# Patient Record
Sex: MALE | Race: WHITE | Employment: OTHER | URBAN - METROPOLITAN AREA
[De-identification: names, ages, dates, MRNs, and addresses within clinical notes are randomized per-mention and may not be internally consistent; named-entity substitution may affect disease eponyms.]

---

## 2023-06-26 ENCOUNTER — OFFICE VISIT (OUTPATIENT)
Dept: PRIMARY CARE | Facility: CLINIC | Age: 68
End: 2023-06-26
Payer: MEDICARE

## 2023-06-26 VITALS
BODY MASS INDEX: 31.92 KG/M2 | TEMPERATURE: 97.5 F | OXYGEN SATURATION: 97 % | WEIGHT: 198.6 LBS | HEIGHT: 66 IN | DIASTOLIC BLOOD PRESSURE: 70 MMHG | SYSTOLIC BLOOD PRESSURE: 134 MMHG | HEART RATE: 88 BPM

## 2023-06-26 DIAGNOSIS — K63.5 POLYP OF COLON, UNSPECIFIED PART OF COLON, UNSPECIFIED TYPE: ICD-10-CM

## 2023-06-26 DIAGNOSIS — E78.5 HYPERLIPIDEMIA, UNSPECIFIED HYPERLIPIDEMIA TYPE: ICD-10-CM

## 2023-06-26 DIAGNOSIS — Z00.00 ANNUAL PHYSICAL EXAM: Primary | ICD-10-CM

## 2023-06-26 DIAGNOSIS — Z12.5 PROSTATE CANCER SCREENING: ICD-10-CM

## 2023-06-26 DIAGNOSIS — D69.6 THROMBOCYTOPENIA (CMS-HCC): ICD-10-CM

## 2023-06-26 DIAGNOSIS — E11.9 DM TYPE 2, NOT AT GOAL (MULTI): ICD-10-CM

## 2023-06-26 PROBLEM — R79.89 ABNORMAL LFTS: Status: ACTIVE | Noted: 2023-06-26

## 2023-06-26 PROBLEM — K62.5 RECTAL BLEEDING: Status: ACTIVE | Noted: 2023-06-26

## 2023-06-26 PROBLEM — R16.0 LIVER MASS: Status: ACTIVE | Noted: 2023-06-26

## 2023-06-26 PROBLEM — K92.1 BLOOD IN STOOL: Status: ACTIVE | Noted: 2023-06-26

## 2023-06-26 PROBLEM — Z86.0100 PERSONAL HISTORY OF COLONIC POLYPS: Status: ACTIVE | Noted: 2023-06-26

## 2023-06-26 PROBLEM — Z86.010 PERSONAL HISTORY OF COLONIC POLYPS: Status: ACTIVE | Noted: 2023-06-26

## 2023-06-26 LAB
ALANINE AMINOTRANSFERASE (SGPT) (U/L) IN SER/PLAS: 39 U/L (ref 10–52)
ALBUMIN (G/DL) IN SER/PLAS: 4.7 G/DL (ref 3.4–5)
ALKALINE PHOSPHATASE (U/L) IN SER/PLAS: 45 U/L (ref 33–136)
ANION GAP IN SER/PLAS: 14 MMOL/L (ref 10–20)
APPEARANCE, URINE: CLEAR
ASPARTATE AMINOTRANSFERASE (SGOT) (U/L) IN SER/PLAS: 25 U/L (ref 9–39)
BILIRUBIN TOTAL (MG/DL) IN SER/PLAS: 1.9 MG/DL (ref 0–1.2)
BILIRUBIN, URINE: NEGATIVE
BLOOD, URINE: NEGATIVE
CALCIUM (MG/DL) IN SER/PLAS: 9.9 MG/DL (ref 8.6–10.6)
CARBON DIOXIDE, TOTAL (MMOL/L) IN SER/PLAS: 27 MMOL/L (ref 21–32)
CHLORIDE (MMOL/L) IN SER/PLAS: 102 MMOL/L (ref 98–107)
CHOLESTEROL (MG/DL) IN SER/PLAS: 168 MG/DL (ref 0–199)
CHOLESTEROL IN HDL (MG/DL) IN SER/PLAS: 38.4 MG/DL
CHOLESTEROL/HDL RATIO: 4.4
COLOR, URINE: ABNORMAL
CREAT UR STRIP-MCNC: 200 MG/DL
CREATININE (MG/DL) IN SER/PLAS: 0.8 MG/DL (ref 0.5–1.3)
ERYTHROCYTE DISTRIBUTION WIDTH (RATIO) BY AUTOMATED COUNT: 13.5 % (ref 11.5–14.5)
ERYTHROCYTE MEAN CORPUSCULAR HEMOGLOBIN CONCENTRATION (G/DL) BY AUTOMATED: 34.3 G/DL (ref 32–36)
ERYTHROCYTE MEAN CORPUSCULAR VOLUME (FL) BY AUTOMATED COUNT: 93 FL (ref 80–100)
ERYTHROCYTES (10*6/UL) IN BLOOD BY AUTOMATED COUNT: 4.87 X10E12/L (ref 4.5–5.9)
GFR MALE: >90 ML/MIN/1.73M2
GLUCOSE (MG/DL) IN SER/PLAS: 202 MG/DL (ref 74–99)
GLUCOSE, URINE: ABNORMAL MG/DL
HBA1C MFR BLD: 7 % (ref 4.2–6.5)
HEMATOCRIT (%) IN BLOOD BY AUTOMATED COUNT: 45.5 % (ref 41–52)
HEMOGLOBIN (G/DL) IN BLOOD: 15.6 G/DL (ref 13.5–17.5)
KETONES, URINE: NEGATIVE MG/DL
LDL: 100 MG/DL (ref 0–99)
LEUKOCYTE ESTERASE, URINE: NEGATIVE
LEUKOCYTES (10*3/UL) IN BLOOD BY AUTOMATED COUNT: 4.7 X10E9/L (ref 4.4–11.3)
MICROALBUMIN UR TEST STR-MCNC: 10 MG/L
NITRITE, URINE: NEGATIVE
NRBC (PER 100 WBCS) BY AUTOMATED COUNT: 0 /100 WBC (ref 0–0)
PH, URINE: 5 (ref 5–8)
PLATELETS (10*3/UL) IN BLOOD AUTOMATED COUNT: 117 X10E9/L (ref 150–450)
POTASSIUM (MMOL/L) IN SER/PLAS: 4.3 MMOL/L (ref 3.5–5.3)
PROSTATE SPECIFIC ANTIGEN,SCREEN: 1.52 NG/ML (ref 0–4)
PROT/CREAT UR: <30 UG/MG CREAT
PROTEIN TOTAL: 7.1 G/DL (ref 6.4–8.2)
PROTEIN, URINE: NEGATIVE MG/DL
SODIUM (MMOL/L) IN SER/PLAS: 139 MMOL/L (ref 136–145)
SPECIFIC GRAVITY, URINE: 1.02 (ref 1–1.03)
TRIGLYCERIDE (MG/DL) IN SER/PLAS: 150 MG/DL (ref 0–149)
UREA NITROGEN (MG/DL) IN SER/PLAS: 16 MG/DL (ref 6–23)
UROBILINOGEN, URINE: 4 MG/DL (ref 0–1.9)
VLDL: 30 MG/DL (ref 0–40)

## 2023-06-26 PROCEDURE — G0103 PSA SCREENING: HCPCS

## 2023-06-26 PROCEDURE — 90715 TDAP VACCINE 7 YRS/> IM: CPT | Performed by: FAMILY MEDICINE

## 2023-06-26 PROCEDURE — 83036 HEMOGLOBIN GLYCOSYLATED A1C: CPT | Performed by: FAMILY MEDICINE

## 2023-06-26 PROCEDURE — 3078F DIAST BP <80 MM HG: CPT | Performed by: FAMILY MEDICINE

## 2023-06-26 PROCEDURE — 81003 URINALYSIS AUTO W/O SCOPE: CPT

## 2023-06-26 PROCEDURE — 90677 PCV20 VACCINE IM: CPT | Performed by: FAMILY MEDICINE

## 2023-06-26 PROCEDURE — 3075F SYST BP GE 130 - 139MM HG: CPT | Performed by: FAMILY MEDICINE

## 2023-06-26 PROCEDURE — 1036F TOBACCO NON-USER: CPT | Performed by: FAMILY MEDICINE

## 2023-06-26 PROCEDURE — 82043 UR ALBUMIN QUANTITATIVE: CPT | Performed by: FAMILY MEDICINE

## 2023-06-26 PROCEDURE — 3051F HG A1C>EQUAL 7.0%<8.0%: CPT | Performed by: FAMILY MEDICINE

## 2023-06-26 PROCEDURE — 90472 IMMUNIZATION ADMIN EACH ADD: CPT | Performed by: FAMILY MEDICINE

## 2023-06-26 PROCEDURE — 82570 ASSAY OF URINE CREATININE: CPT | Performed by: FAMILY MEDICINE

## 2023-06-26 PROCEDURE — 1159F MED LIST DOCD IN RCRD: CPT | Performed by: FAMILY MEDICINE

## 2023-06-26 PROCEDURE — 85027 COMPLETE CBC AUTOMATED: CPT

## 2023-06-26 PROCEDURE — 80053 COMPREHEN METABOLIC PANEL: CPT

## 2023-06-26 PROCEDURE — 99397 PER PM REEVAL EST PAT 65+ YR: CPT | Performed by: FAMILY MEDICINE

## 2023-06-26 PROCEDURE — G0009 ADMIN PNEUMOCOCCAL VACCINE: HCPCS | Performed by: FAMILY MEDICINE

## 2023-06-26 PROCEDURE — 80061 LIPID PANEL: CPT

## 2023-06-26 RX ORDER — METFORMIN HYDROCHLORIDE 500 MG/1
500 TABLET ORAL EVERY 12 HOURS
Qty: 180 TABLET | Refills: 3 | Status: SHIPPED | OUTPATIENT
Start: 2023-06-26 | End: 2023-10-30 | Stop reason: SDUPTHER

## 2023-06-26 RX ORDER — METFORMIN HYDROCHLORIDE 500 MG/1
500 TABLET ORAL EVERY 12 HOURS
COMMUNITY
End: 2023-06-26 | Stop reason: SDUPTHER

## 2023-06-26 SDOH — HEALTH STABILITY: PHYSICAL HEALTH: ON AVERAGE, HOW MANY MINUTES DO YOU ENGAGE IN EXERCISE AT THIS LEVEL?: 40 MIN

## 2023-06-26 SDOH — HEALTH STABILITY: PHYSICAL HEALTH: ON AVERAGE, HOW MANY DAYS PER WEEK DO YOU ENGAGE IN MODERATE TO STRENUOUS EXERCISE (LIKE A BRISK WALK)?: 6 DAYS

## 2023-06-26 ASSESSMENT — PAIN SCALES - GENERAL: PAINLEVEL: 0-NO PAIN

## 2023-06-26 NOTE — PROGRESS NOTES
"Subjective   Patient ID: Candice Ram is a 68 y.o. male who presents for New Patient Visit (Establish care with pcp, pt is fasting. ), Annual Exam (Annual physical exam. ), and Med Refill (Medication refill needed. ).    HPI patient here with his daughter who interprets for him as he mostly speaks Hungarian.  Lived here 20 yrs.    Occupation: [employer] for 20 yrs.  DM Type 2: 5-6 yrs           BSs runs about 200  Walks everyday outside and on treadmill  Mother with DM.  Daughter states he is not been good with his diet for last year.  Initially when diagnosed, he was much better watching his  Review of Systems  Cardiovascular: no chest pain, no edema, no palpitations, and no syncope.   Respiratory: no cough,no shortness of breath, and no wheezing  Gastrointestinal: no abdominal pain, nausea, vomiting or blood in stools  Genitourinary: no dysuria or hematuria  Objective   /70 (BP Location: Left arm, Patient Position: Sitting, BP Cuff Size: Adult)   Pulse 88   Temp 36.4 °C (97.5 °F) (Temporal)   Ht 1.664 m (5' 5.5\")   Wt 90.1 kg (198 lb 9.6 oz)   SpO2 97%   BMI 32.55 kg/m²     Physical Exam  General: Alert, pleasant and in no acute distress  HEENT: Extraocular motor intact, PERRLA  Neck: supple, nontender, no palpable or enlarged nodes, no thyromegaly  Heart: Regular rate and rhythm, no murmurs  Lungs: Clear to auscultation with good air exchange  Chest- symmetric, nontender  Abdomen: Soft, nontender, no palpable mass or organomegaly  Lower extremities: No edema  Neuro: CNs 2-12 grossly intact, full sensation and strength in all extremities  Skin: No atypical rashes or lesions  Assessment/Plan   Problem List Items Addressed This Visit    None  Visit Diagnoses       Annual physical exam    -  Primary    DM type 2, not at goal (CMS/HCC)        Relevant Medications    metFORMIN (Glucophage) 500 mg tablet    Other Relevant Orders    Comprehensive Metabolic Panel    POCT Glycosylated Hemoglobin (HGB A1C) docked " device    Urinalysis with Reflex Microscopic    POCT Albumin Random Urine docked device    Thrombocytopenia (CMS/HCC)        Relevant Orders    Comprehensive Metabolic Panel    CBC    Prostate cancer screening        Relevant Orders    Prostate Specific Antigen, Screen    Hyperlipidemia, unspecified hyperlipidemia type        Relevant Orders    Comprehensive Metabolic Panel    Lipid Panel    Polyp of colon, unspecified part of colon, unspecified type        Relevant Orders    Colonoscopy        Patient here to establish and get a checkup.  1.  Annual: We will get routine labs as well as a PSA.  Discussed importance of vaccinations.  Prevnar and Tdap provided today.  Shingles vaccine recommended from local pharmacy.  2.  Diabetes mellitus type 2: Discussed the importance of sugars.  Today's A1c good at 7.0.  We will continue metformin.  Stressed the importance of watching the diet and avoiding sweets.  3.  Thrombocytopenia: We will check CBC.  4.  Hyperlipidemia: We will check   5.  Colon polyps: Referral for colonoscopy as it has been 5 years.  Follow-up in 4 months

## 2023-07-05 ENCOUNTER — TELEPHONE (OUTPATIENT)
Dept: PRIMARY CARE | Facility: CLINIC | Age: 68
End: 2023-07-05
Payer: MEDICARE

## 2023-07-05 NOTE — TELEPHONE ENCOUNTER
----- Message from Jorge Luis Brwon DO sent at 7/4/2023 12:40 PM EDT -----  Please let patient's daughter know that his blood work was all stable.  Recommend follow-up in about 4 to 5 months  ----- Message -----  From: Lab, Background User  Sent: 6/26/2023   9:13 AM EDT  To: Jorge Luis Brown DO

## 2023-08-08 ENCOUNTER — OFFICE VISIT (OUTPATIENT)
Dept: PRIMARY CARE | Facility: CLINIC | Age: 68
End: 2023-08-08
Payer: MEDICARE

## 2023-08-08 VITALS
OXYGEN SATURATION: 95 % | SYSTOLIC BLOOD PRESSURE: 134 MMHG | DIASTOLIC BLOOD PRESSURE: 74 MMHG | BODY MASS INDEX: 31.5 KG/M2 | HEIGHT: 66 IN | HEART RATE: 98 BPM | WEIGHT: 196 LBS

## 2023-08-08 DIAGNOSIS — J20.9 ACUTE BRONCHITIS, UNSPECIFIED ORGANISM: Primary | ICD-10-CM

## 2023-08-08 PROCEDURE — 1159F MED LIST DOCD IN RCRD: CPT | Performed by: FAMILY MEDICINE

## 2023-08-08 PROCEDURE — 1036F TOBACCO NON-USER: CPT | Performed by: FAMILY MEDICINE

## 2023-08-08 PROCEDURE — 3078F DIAST BP <80 MM HG: CPT | Performed by: FAMILY MEDICINE

## 2023-08-08 PROCEDURE — 1126F AMNT PAIN NOTED NONE PRSNT: CPT | Performed by: FAMILY MEDICINE

## 2023-08-08 PROCEDURE — 3075F SYST BP GE 130 - 139MM HG: CPT | Performed by: FAMILY MEDICINE

## 2023-08-08 PROCEDURE — 3051F HG A1C>EQUAL 7.0%<8.0%: CPT | Performed by: FAMILY MEDICINE

## 2023-08-08 PROCEDURE — 99213 OFFICE O/P EST LOW 20 MIN: CPT | Performed by: FAMILY MEDICINE

## 2023-08-08 RX ORDER — CODEINE PHOSPHATE AND GUAIFENESIN 10; 100 MG/5ML; MG/5ML
5 SOLUTION ORAL EVERY 6 HOURS PRN
Qty: 120 ML | Refills: 0 | Status: SHIPPED | OUTPATIENT
Start: 2023-08-08 | End: 2023-08-15

## 2023-08-08 ASSESSMENT — ENCOUNTER SYMPTOMS: DEPRESSION: 0

## 2023-08-08 ASSESSMENT — PAIN SCALES - GENERAL: PAINLEVEL: 0-NO PAIN

## 2023-08-08 NOTE — PROGRESS NOTES
"Subjective   Patient ID: Candice Ram is a 68 y.o. male who presents for Cough (Cough for 8 weeks. ).    HPI patient does not speak English.  He has brought a family member to interpret.  He has had a persistent cough for 8 weeks.  He has not had any fevers or chills.  Cough is nonproductive.  He does not have any sinus type congestion or drainage.  No shortness of breath.  He is eating and drinking well.    Review of Systems    Objective   /74 (BP Location: Left arm, Patient Position: Sitting, BP Cuff Size: Adult)   Pulse 98   Ht 1.664 m (5' 5.5\")   Wt 88.9 kg (196 lb)   SpO2 95%   BMI 32.12 kg/m²     Physical Exam  Alert, pleasant and in no acute distress.  HEENT: Normocephalic, atraumatic.  Ears: Canals clear.  Tympanic membranes intact and pearly gray.  Nose: Nares reveal mildly inflamed turbinates.  Mouth: No mucosal lesions noted.  No pharyngeal erythema.  Neck: Supple.  No palpable lymphadenopathy.  Heart: Regular rate and rhythm without murmur  Lungs: Clear to auscultation    Assessment/Plan   Problem List Items Addressed This Visit    None  Visit Diagnoses       Acute bronchitis, unspecified organism    -  Primary    Relevant Medications    codeine-guaifenesin (Robitussin-AC)  mg/5 mL syrup        Patient with an acute persisting bronchitis.  We will treat with some cough syrup for nighttime.  Reviewed in detail the importance of safe use of this controlled substance.  [place] was [place].  Advised to use the cough syrup just at nighttime but if the cough gets severe during the day he can use some.  If things are not improving over the next 7 to 10 days would then look at a chest x-ray and more aggressive treatment.       "

## 2023-10-30 ENCOUNTER — OFFICE VISIT (OUTPATIENT)
Dept: PRIMARY CARE | Facility: CLINIC | Age: 68
End: 2023-10-30
Payer: MEDICARE

## 2023-10-30 VITALS
TEMPERATURE: 97.1 F | SYSTOLIC BLOOD PRESSURE: 120 MMHG | OXYGEN SATURATION: 97 % | HEIGHT: 65 IN | DIASTOLIC BLOOD PRESSURE: 66 MMHG | BODY MASS INDEX: 32.89 KG/M2 | HEART RATE: 88 BPM | WEIGHT: 197.4 LBS

## 2023-10-30 DIAGNOSIS — I10 ESSENTIAL (PRIMARY) HYPERTENSION: ICD-10-CM

## 2023-10-30 DIAGNOSIS — Z00.00 HEALTH CARE MAINTENANCE: Primary | ICD-10-CM

## 2023-10-30 DIAGNOSIS — Z87.891 FORMER SMOKER: ICD-10-CM

## 2023-10-30 DIAGNOSIS — E11.9 DM TYPE 2, NOT AT GOAL (MULTI): ICD-10-CM

## 2023-10-30 DIAGNOSIS — D69.6 THROMBOCYTOPENIA (CMS-HCC): ICD-10-CM

## 2023-10-30 LAB
ERYTHROCYTE [DISTWIDTH] IN BLOOD BY AUTOMATED COUNT: 13.4 % (ref 11.5–14.5)
HBA1C MFR BLD: 7.8 % (ref 4.2–6.5)
HCT VFR BLD AUTO: 43.8 % (ref 41–52)
HGB BLD-MCNC: 15.1 G/DL (ref 13.5–17.5)
MCH RBC QN AUTO: 31.6 PG (ref 26–34)
MCHC RBC AUTO-ENTMCNC: 34.5 G/DL (ref 32–36)
MCV RBC AUTO: 92 FL (ref 80–100)
NRBC BLD-RTO: 0 /100 WBCS (ref 0–0)
PLATELET # BLD AUTO: 121 X10*3/UL (ref 150–450)
PMV BLD AUTO: 10.3 FL (ref 7.5–11.5)
RBC # BLD AUTO: 4.78 X10*6/UL (ref 4.5–5.9)
WBC # BLD AUTO: 4.5 X10*3/UL (ref 4.4–11.3)

## 2023-10-30 PROCEDURE — G0439 PPPS, SUBSEQ VISIT: HCPCS | Performed by: FAMILY MEDICINE

## 2023-10-30 PROCEDURE — 3078F DIAST BP <80 MM HG: CPT | Performed by: FAMILY MEDICINE

## 2023-10-30 PROCEDURE — 1160F RVW MEDS BY RX/DR IN RCRD: CPT | Performed by: FAMILY MEDICINE

## 2023-10-30 PROCEDURE — 3051F HG A1C>EQUAL 7.0%<8.0%: CPT | Performed by: FAMILY MEDICINE

## 2023-10-30 PROCEDURE — 82570 ASSAY OF URINE CREATININE: CPT

## 2023-10-30 PROCEDURE — 85027 COMPLETE CBC AUTOMATED: CPT

## 2023-10-30 PROCEDURE — 1126F AMNT PAIN NOTED NONE PRSNT: CPT | Performed by: FAMILY MEDICINE

## 2023-10-30 PROCEDURE — 83036 HEMOGLOBIN GLYCOSYLATED A1C: CPT | Mod: CLIA WAIVED TEST | Performed by: FAMILY MEDICINE

## 2023-10-30 PROCEDURE — 86803 HEPATITIS C AB TEST: CPT

## 2023-10-30 PROCEDURE — 3074F SYST BP LT 130 MM HG: CPT | Performed by: FAMILY MEDICINE

## 2023-10-30 PROCEDURE — 2028F FOOT EXAM PERFORMED: CPT | Performed by: FAMILY MEDICINE

## 2023-10-30 PROCEDURE — 1036F TOBACCO NON-USER: CPT | Performed by: FAMILY MEDICINE

## 2023-10-30 PROCEDURE — 1170F FXNL STATUS ASSESSED: CPT | Performed by: FAMILY MEDICINE

## 2023-10-30 PROCEDURE — 36415 COLL VENOUS BLD VENIPUNCTURE: CPT

## 2023-10-30 PROCEDURE — 1159F MED LIST DOCD IN RCRD: CPT | Performed by: FAMILY MEDICINE

## 2023-10-30 PROCEDURE — 82043 UR ALBUMIN QUANTITATIVE: CPT

## 2023-10-30 RX ORDER — METFORMIN HYDROCHLORIDE 850 MG/1
850 TABLET ORAL EVERY 12 HOURS
Qty: 180 TABLET | Refills: 3 | Status: SHIPPED | OUTPATIENT
Start: 2023-10-30 | End: 2024-10-29

## 2023-10-30 ASSESSMENT — ENCOUNTER SYMPTOMS
OCCASIONAL FEELINGS OF UNSTEADINESS: 0
DEPRESSION: 0
LOSS OF SENSATION IN FEET: 0

## 2023-10-30 ASSESSMENT — PATIENT HEALTH QUESTIONNAIRE - PHQ9
2. FEELING DOWN, DEPRESSED OR HOPELESS: NOT AT ALL
1. LITTLE INTEREST OR PLEASURE IN DOING THINGS: NOT AT ALL
SUM OF ALL RESPONSES TO PHQ9 QUESTIONS 1 AND 2: 0

## 2023-10-30 ASSESSMENT — ACTIVITIES OF DAILY LIVING (ADL)
DRESSING: INDEPENDENT
BATHING: INDEPENDENT
MANAGING_FINANCES: INDEPENDENT
GROCERY_SHOPPING: INDEPENDENT
DOING_HOUSEWORK: INDEPENDENT
TAKING_MEDICATION: INDEPENDENT

## 2023-10-30 ASSESSMENT — PAIN SCALES - GENERAL: PAINLEVEL: 0-NO PAIN

## 2023-10-30 NOTE — PROGRESS NOTES
"Subjective   Patient ID: Candice Ram is a 68 y.o. male who presents for Medicare Annual Wellness Visit Subsequent (Medicare annual exam,pt is fasting. ) and Annual Exam (Annual wellness exam, check sugar. ).    HPI Patient doing well.  He is accompanied by his daughter who translates.  Last eye appt: 2 yrs ago  Review of Systems    Objective   /66 (BP Location: Left arm, Patient Position: Sitting, BP Cuff Size: Adult)   Pulse 88   Temp 36.2 °C (97.1 °F) (Temporal)   Ht 1.651 m (5' 5\")   Wt 89.5 kg (197 lb 6.4 oz)   SpO2 97%   BMI 32.85 kg/m²     Physical Exam  Alert, pleasant and in no acute distress.  Heart: Regular rate and rhythm without murmur  Lungs: Clear to auscultation  Lower extremities: No edema.  Feet clear of lesions  Assessment/Plan   Problem List Items Addressed This Visit    None  Visit Diagnoses         Codes    Health care maintenance    -  Primary Z00.00    Relevant Orders    Hepatitis C antibody    Albumin , Urine Random    Vascular US Abdominal Aorta Aneurysm AAA Screening    DM type 2, not at goal (CMS/[facility])     E11.9    Relevant Medications    metFORMIN (Glucophage) 850 mg tablet    Other Relevant Orders    POCT Glycosylated Hemoglobin (HGB A1C) docked device    Albumin , Urine Random    Thrombocytopenia (CMS/[facility])     D69.6    Relevant Orders    CBC    Essential (primary) hypertension     I10    Former smoker     Z87.891    Relevant Orders    Vascular US Abdominal Aorta Aneurysm AAA Screening        1.  Health maintenance: Medicare care gaps addressed  2.  Diabetes mellitus type 2: A1c today went up a little to 7.8 from 7.0.  We are going to bump up his metformin from 500 mg to 850 mg twice daily.  Encouraged eye appointment  3.  Thrombocytopenia: Patient with a chronic mild site of [place].  We will recheck CBC  4.  hypertension: Stable  5.  Former smoker: Patient is a remote smoker.  We will get a AAA screen    We will contact patient with results as they come in.  Plan follow-up " in 3 to 4 months to recheck A1c

## 2023-10-31 LAB
CREAT UR-MCNC: 145.2 MG/DL (ref 20–370)
HCV AB SER QL: NONREACTIVE
MICROALBUMIN UR-MCNC: <7 MG/L
MICROALBUMIN/CREAT UR: NORMAL MG/G{CREAT}

## 2023-12-04 ENCOUNTER — HOSPITAL ENCOUNTER (OUTPATIENT)
Dept: VASCULAR MEDICINE | Facility: CLINIC | Age: 68
Discharge: HOME | End: 2023-12-04
Payer: MEDICARE

## 2023-12-04 DIAGNOSIS — Z87.891 FORMER SMOKER: ICD-10-CM

## 2023-12-04 DIAGNOSIS — Z00.00 HEALTH CARE MAINTENANCE: ICD-10-CM

## 2023-12-04 DIAGNOSIS — Z13.6 ENCOUNTER FOR SCREENING FOR CARDIOVASCULAR DISORDERS: ICD-10-CM

## 2023-12-04 PROCEDURE — 76706 US ABDL AORTA SCREEN AAA: CPT | Performed by: SURGERY

## 2023-12-04 PROCEDURE — 76706 US ABDL AORTA SCREEN AAA: CPT

## 2024-04-01 ENCOUNTER — OFFICE VISIT (OUTPATIENT)
Dept: PRIMARY CARE | Facility: CLINIC | Age: 69
End: 2024-04-01
Payer: MEDICARE

## 2024-04-01 VITALS
OXYGEN SATURATION: 97 % | SYSTOLIC BLOOD PRESSURE: 140 MMHG | BODY MASS INDEX: 33.09 KG/M2 | DIASTOLIC BLOOD PRESSURE: 60 MMHG | HEART RATE: 78 BPM | HEIGHT: 65 IN | WEIGHT: 198.6 LBS

## 2024-04-01 DIAGNOSIS — E11.9 DM TYPE 2, NOT AT GOAL (MULTI): Primary | ICD-10-CM

## 2024-04-01 LAB — HBA1C MFR BLD: 6.3 % (ref 4.2–6.5)

## 2024-04-01 PROCEDURE — 1036F TOBACCO NON-USER: CPT | Performed by: FAMILY MEDICINE

## 2024-04-01 PROCEDURE — 3077F SYST BP >= 140 MM HG: CPT | Performed by: FAMILY MEDICINE

## 2024-04-01 PROCEDURE — 1126F AMNT PAIN NOTED NONE PRSNT: CPT | Performed by: FAMILY MEDICINE

## 2024-04-01 PROCEDURE — 83036 HEMOGLOBIN GLYCOSYLATED A1C: CPT | Mod: CLIA WAIVED TEST | Performed by: FAMILY MEDICINE

## 2024-04-01 PROCEDURE — 1159F MED LIST DOCD IN RCRD: CPT | Performed by: FAMILY MEDICINE

## 2024-04-01 PROCEDURE — 99213 OFFICE O/P EST LOW 20 MIN: CPT | Performed by: FAMILY MEDICINE

## 2024-04-01 PROCEDURE — 3044F HG A1C LEVEL LT 7.0%: CPT | Performed by: FAMILY MEDICINE

## 2024-04-01 PROCEDURE — 3078F DIAST BP <80 MM HG: CPT | Performed by: FAMILY MEDICINE

## 2024-04-01 ASSESSMENT — ENCOUNTER SYMPTOMS: DEPRESSION: 0

## 2024-04-01 ASSESSMENT — PAIN SCALES - GENERAL: PAINLEVEL: 0-NO PAIN

## 2024-04-01 NOTE — PROGRESS NOTES
"Subjective   Patient ID: Candice Ram is a 68 y.o. male who presents for Diabetes (5 month follow up.).    HPI   Patient here for follow-up on diabetes.  Diet is fairly good.  He does exercise daily with his exercise bike.  Review of Systems    Objective   /60 (BP Location: Left arm, Patient Position: Sitting, BP Cuff Size: Adult)   Pulse 78   Ht 1.651 m (5' 5\")   Wt 90.1 kg (198 lb 9.6 oz)   SpO2 97%   BMI 33.05 kg/m²     Physical Exam  Alert, pleasant and in no acute distress.  Heart: Regular rate and rhythm without murmur  Lungs: Clear to auscultation  Lower extremities: No edema  Assessment/Plan     Plan full labs next visit.  A1c excellent at 6.3.  Continue metformin.     "

## 2024-08-05 ENCOUNTER — APPOINTMENT (OUTPATIENT)
Dept: PRIMARY CARE | Facility: CLINIC | Age: 69
End: 2024-08-05
Payer: MEDICARE

## 2024-08-05 VITALS
OXYGEN SATURATION: 95 % | DIASTOLIC BLOOD PRESSURE: 62 MMHG | WEIGHT: 198.8 LBS | SYSTOLIC BLOOD PRESSURE: 130 MMHG | BODY MASS INDEX: 33.08 KG/M2 | HEART RATE: 92 BPM

## 2024-08-05 DIAGNOSIS — I10 ESSENTIAL (PRIMARY) HYPERTENSION: ICD-10-CM

## 2024-08-05 DIAGNOSIS — Z00.00 HEALTH CARE MAINTENANCE: ICD-10-CM

## 2024-08-05 DIAGNOSIS — D69.6 THROMBOCYTOPENIA (CMS-HCC): ICD-10-CM

## 2024-08-05 DIAGNOSIS — E11.9 DM TYPE 2, NOT AT GOAL (MULTI): Primary | ICD-10-CM

## 2024-08-05 DIAGNOSIS — E78.5 HYPERLIPIDEMIA, UNSPECIFIED HYPERLIPIDEMIA TYPE: ICD-10-CM

## 2024-08-05 DIAGNOSIS — Z12.5 PROSTATE CANCER SCREENING: ICD-10-CM

## 2024-08-05 PROBLEM — K92.1 HEMATOCHEZIA: Status: ACTIVE | Noted: 2024-08-05

## 2024-08-05 LAB
ALBUMIN SERPL BCP-MCNC: 4.7 G/DL (ref 3.4–5)
ALP SERPL-CCNC: 39 U/L (ref 33–136)
ALT SERPL W P-5'-P-CCNC: 41 U/L (ref 10–52)
ANION GAP SERPL CALC-SCNC: 15 MMOL/L (ref 10–20)
AST SERPL W P-5'-P-CCNC: 27 U/L (ref 9–39)
BILIRUB SERPL-MCNC: 1.6 MG/DL (ref 0–1.2)
BUN SERPL-MCNC: 18 MG/DL (ref 6–23)
CALCIUM SERPL-MCNC: 9.7 MG/DL (ref 8.6–10.6)
CHLORIDE SERPL-SCNC: 101 MMOL/L (ref 98–107)
CHOLEST SERPL-MCNC: 185 MG/DL (ref 0–199)
CHOLESTEROL/HDL RATIO: 4.6
CO2 SERPL-SCNC: 26 MMOL/L (ref 21–32)
CREAT SERPL-MCNC: 0.73 MG/DL (ref 0.5–1.3)
EGFRCR SERPLBLD CKD-EPI 2021: >90 ML/MIN/1.73M*2
ERYTHROCYTE [DISTWIDTH] IN BLOOD BY AUTOMATED COUNT: 14 % (ref 11.5–14.5)
GLUCOSE SERPL-MCNC: 219 MG/DL (ref 74–99)
HBA1C MFR BLD: 6.7 % (ref 4.2–6.5)
HCT VFR BLD AUTO: 43.7 % (ref 41–52)
HDLC SERPL-MCNC: 40.3 MG/DL
HGB BLD-MCNC: 15.1 G/DL (ref 13.5–17.5)
LDLC SERPL CALC-MCNC: 102 MG/DL
MCH RBC QN AUTO: 32.4 PG (ref 26–34)
MCHC RBC AUTO-ENTMCNC: 34.6 G/DL (ref 32–36)
MCV RBC AUTO: 94 FL (ref 80–100)
NON HDL CHOLESTEROL: 145 MG/DL (ref 0–149)
NRBC BLD-RTO: 0 /100 WBCS (ref 0–0)
PLATELET # BLD AUTO: 124 X10*3/UL (ref 150–450)
POTASSIUM SERPL-SCNC: 4.1 MMOL/L (ref 3.5–5.3)
PROT SERPL-MCNC: 6.9 G/DL (ref 6.4–8.2)
PSA SERPL-MCNC: 1.98 NG/ML
RBC # BLD AUTO: 4.66 X10*6/UL (ref 4.5–5.9)
SODIUM SERPL-SCNC: 138 MMOL/L (ref 136–145)
TRIGL SERPL-MCNC: 214 MG/DL (ref 0–149)
VLDL: 43 MG/DL (ref 0–40)
WBC # BLD AUTO: 4.2 X10*3/UL (ref 4.4–11.3)

## 2024-08-05 PROCEDURE — 1158F ADVNC CARE PLAN TLK DOCD: CPT | Performed by: FAMILY MEDICINE

## 2024-08-05 PROCEDURE — 3044F HG A1C LEVEL LT 7.0%: CPT | Performed by: FAMILY MEDICINE

## 2024-08-05 PROCEDURE — 1036F TOBACCO NON-USER: CPT | Performed by: FAMILY MEDICINE

## 2024-08-05 PROCEDURE — 1159F MED LIST DOCD IN RCRD: CPT | Performed by: FAMILY MEDICINE

## 2024-08-05 PROCEDURE — 99214 OFFICE O/P EST MOD 30 MIN: CPT | Performed by: FAMILY MEDICINE

## 2024-08-05 PROCEDURE — 80053 COMPREHEN METABOLIC PANEL: CPT

## 2024-08-05 PROCEDURE — 3078F DIAST BP <80 MM HG: CPT | Performed by: FAMILY MEDICINE

## 2024-08-05 PROCEDURE — 1123F ACP DISCUSS/DSCN MKR DOCD: CPT | Performed by: FAMILY MEDICINE

## 2024-08-05 PROCEDURE — 1126F AMNT PAIN NOTED NONE PRSNT: CPT | Performed by: FAMILY MEDICINE

## 2024-08-05 PROCEDURE — 80061 LIPID PANEL: CPT

## 2024-08-05 PROCEDURE — G0103 PSA SCREENING: HCPCS

## 2024-08-05 PROCEDURE — 3075F SYST BP GE 130 - 139MM HG: CPT | Performed by: FAMILY MEDICINE

## 2024-08-05 PROCEDURE — 83036 HEMOGLOBIN GLYCOSYLATED A1C: CPT | Mod: CLIA WAIVED TEST | Performed by: FAMILY MEDICINE

## 2024-08-05 PROCEDURE — 81001 URINALYSIS AUTO W/SCOPE: CPT

## 2024-08-05 PROCEDURE — 36415 COLL VENOUS BLD VENIPUNCTURE: CPT

## 2024-08-05 PROCEDURE — 85027 COMPLETE CBC AUTOMATED: CPT

## 2024-08-05 ASSESSMENT — ENCOUNTER SYMPTOMS: DEPRESSION: 0

## 2024-08-05 ASSESSMENT — PAIN SCALES - GENERAL: PAINLEVEL: 0-NO PAIN

## 2024-08-05 NOTE — PROGRESS NOTES
Subjective   Patient ID: Candice Ram is a 69 y.o. male who presents for Diabetes (4 month follow up for diabetes.).    HPI   Patient here today with his daughter.  He is doing well.  He keeps physically active as well as walking outside around his treadmill on a regular basis.  No complaints  Last eye appt 1 yr ago  Review of Systems    Objective   /62 (BP Location: Left arm, Patient Position: Sitting, BP Cuff Size: Adult)   Pulse 92   Wt 90.2 kg (198 lb 12.8 oz)   SpO2 95%   BMI 33.08 kg/m²     Physical Exam  Alert, pleasant and in no acute distress.  Neck: Supple, no JVD or carotid bruit  Heart: Regular rate and rhythm, no murmur  Lungs: Clear to auscultation  Lower extremities: No edema    Assessment/Plan   Problem List Items Addressed This Visit    None  Visit Diagnoses         Codes    DM type 2, not at goal (Multi)    -  Primary E11.9    Relevant Orders    Urinalysis with Reflex Microscopic    Comprehensive Metabolic Panel    CBC    Lipid Panel    POCT Glycosylated Hemoglobin (HGB A1C) docked device    Thrombocytopenia (CMS-HCC)     D69.6    Relevant Orders    CBC    Essential (primary) hypertension     I10    Relevant Orders    Urinalysis with Reflex Microscopic    Comprehensive Metabolic Panel    CBC    Lipid Panel    Hyperlipidemia, unspecified hyperlipidemia type     E78.5    Relevant Orders    Lipid Panel    Health care maintenance     Z00.00    Relevant Orders    Prostate Specific Antigen, Screen    Urinalysis with Reflex Microscopic    Comprehensive Metabolic Panel    CBC    Lipid Panel    POCT Glycosylated Hemoglobin (HGB A1C) docked device    Prostate cancer screening     Z12.5    Relevant Orders    Prostate Specific Antigen, Screen        1.  Diabetes mellitus type 2: A1c shows good control at 6.7  2.  Thrombocytopenia: This has been chronic and stable and we will recheck level  3.  Hypertension: Under good control  4.  Hyperlipidemia: Under good control.  Will recheck level.  5.   Health maintenance: PSA screening.  Routine labs.  Care gaps addressed and up-to-date

## 2024-08-06 LAB
APPEARANCE UR: CLEAR
BILIRUB UR STRIP.AUTO-MCNC: NEGATIVE MG/DL
CAOX CRY #/AREA UR COMP ASSIST: NORMAL /HPF
COLOR UR: YELLOW
GLUCOSE UR STRIP.AUTO-MCNC: ABNORMAL MG/DL
KETONES UR STRIP.AUTO-MCNC: NEGATIVE MG/DL
LEUKOCYTE ESTERASE UR QL STRIP.AUTO: NEGATIVE
MUCOUS THREADS #/AREA URNS AUTO: NORMAL /LPF
NITRITE UR QL STRIP.AUTO: NEGATIVE
PH UR STRIP.AUTO: 5.5 [PH]
PROT UR STRIP.AUTO-MCNC: ABNORMAL MG/DL
RBC # UR STRIP.AUTO: NEGATIVE /UL
RBC #/AREA URNS AUTO: NORMAL /HPF
SP GR UR STRIP.AUTO: 1.02
UROBILINOGEN UR STRIP.AUTO-MCNC: ABNORMAL MG/DL
WBC #/AREA URNS AUTO: NORMAL /HPF

## 2024-10-15 DIAGNOSIS — E11.9 DM TYPE 2, NOT AT GOAL: ICD-10-CM

## 2024-10-15 RX ORDER — METFORMIN HYDROCHLORIDE 850 MG/1
850 TABLET ORAL EVERY 12 HOURS
Qty: 180 TABLET | Refills: 0 | Status: SHIPPED | OUTPATIENT
Start: 2024-10-15

## 2024-12-09 ENCOUNTER — APPOINTMENT (OUTPATIENT)
Dept: PRIMARY CARE | Facility: CLINIC | Age: 69
End: 2024-12-09
Payer: MEDICARE

## 2024-12-09 VITALS
BODY MASS INDEX: 33.05 KG/M2 | OXYGEN SATURATION: 96 % | HEART RATE: 90 BPM | SYSTOLIC BLOOD PRESSURE: 132 MMHG | HEIGHT: 65 IN | DIASTOLIC BLOOD PRESSURE: 60 MMHG | WEIGHT: 198.4 LBS

## 2024-12-09 DIAGNOSIS — Z00.00 ANNUAL PHYSICAL EXAM: Primary | ICD-10-CM

## 2024-12-09 DIAGNOSIS — E11.9 CONTROLLED TYPE 2 DIABETES MELLITUS WITHOUT COMPLICATION, WITHOUT LONG-TERM CURRENT USE OF INSULIN (MULTI): ICD-10-CM

## 2024-12-09 PROCEDURE — 82043 UR ALBUMIN QUANTITATIVE: CPT

## 2024-12-09 PROCEDURE — 3078F DIAST BP <80 MM HG: CPT | Performed by: FAMILY MEDICINE

## 2024-12-09 PROCEDURE — 3062F POS MACROALBUMINURIA REV: CPT | Performed by: FAMILY MEDICINE

## 2024-12-09 PROCEDURE — 83036 HEMOGLOBIN GLYCOSYLATED A1C: CPT | Performed by: FAMILY MEDICINE

## 2024-12-09 PROCEDURE — 3044F HG A1C LEVEL LT 7.0%: CPT | Performed by: FAMILY MEDICINE

## 2024-12-09 PROCEDURE — 1126F AMNT PAIN NOTED NONE PRSNT: CPT | Performed by: FAMILY MEDICINE

## 2024-12-09 PROCEDURE — 82570 ASSAY OF URINE CREATININE: CPT

## 2024-12-09 PROCEDURE — 1123F ACP DISCUSS/DSCN MKR DOCD: CPT | Performed by: FAMILY MEDICINE

## 2024-12-09 PROCEDURE — 1160F RVW MEDS BY RX/DR IN RCRD: CPT | Performed by: FAMILY MEDICINE

## 2024-12-09 PROCEDURE — G0439 PPPS, SUBSEQ VISIT: HCPCS | Performed by: FAMILY MEDICINE

## 2024-12-09 PROCEDURE — 99213 OFFICE O/P EST LOW 20 MIN: CPT | Performed by: FAMILY MEDICINE

## 2024-12-09 PROCEDURE — 3008F BODY MASS INDEX DOCD: CPT | Performed by: FAMILY MEDICINE

## 2024-12-09 PROCEDURE — 3075F SYST BP GE 130 - 139MM HG: CPT | Performed by: FAMILY MEDICINE

## 2024-12-09 PROCEDURE — 1159F MED LIST DOCD IN RCRD: CPT | Performed by: FAMILY MEDICINE

## 2024-12-09 PROCEDURE — 1158F ADVNC CARE PLAN TLK DOCD: CPT | Performed by: FAMILY MEDICINE

## 2024-12-09 PROCEDURE — 3049F LDL-C 100-129 MG/DL: CPT | Performed by: FAMILY MEDICINE

## 2024-12-09 PROCEDURE — 1170F FXNL STATUS ASSESSED: CPT | Performed by: FAMILY MEDICINE

## 2024-12-09 ASSESSMENT — PATIENT HEALTH QUESTIONNAIRE - PHQ9
SUM OF ALL RESPONSES TO PHQ9 QUESTIONS 1 AND 2: 0
2. FEELING DOWN, DEPRESSED OR HOPELESS: NOT AT ALL
1. LITTLE INTEREST OR PLEASURE IN DOING THINGS: NOT AT ALL

## 2024-12-09 ASSESSMENT — ENCOUNTER SYMPTOMS
LOSS OF SENSATION IN FEET: 0
DEPRESSION: 0
OCCASIONAL FEELINGS OF UNSTEADINESS: 0

## 2024-12-09 ASSESSMENT — PAIN SCALES - GENERAL: PAINLEVEL_OUTOF10: 0-NO PAIN

## 2024-12-09 ASSESSMENT — ACTIVITIES OF DAILY LIVING (ADL)
MANAGING_FINANCES: INDEPENDENT
TAKING_MEDICATION: INDEPENDENT
DRESSING: INDEPENDENT
DOING_HOUSEWORK: INDEPENDENT
BATHING: INDEPENDENT
GROCERY_SHOPPING: INDEPENDENT

## 2024-12-09 NOTE — PROGRESS NOTES
"Subjective   Patient ID: Candice Ram is a 69 y.o. male who presents for Medicare Annual Wellness Visit Subsequent (He is fasting.).    HPI   Patient here with daughter.  Doing well.  No complaints.  Last eye appt: August 2024  Review of Systems  Cardiovascular: no chest pain, no edema, no palpitations, and no syncope.   Respiratory: no cough,no shortness of breath, and no wheezing  Gastrointestinal: no abdominal pain, nausea, vomiting or blood in stools  Genitourinary: no dysuria or hematuria    Objective   /60 (BP Location: Right arm, Patient Position: Sitting, BP Cuff Size: Adult)   Pulse 90   Ht 1.651 m (5' 5\")   Wt 90 kg (198 lb 6.4 oz)   SpO2 96%   BMI 33.02 kg/m²     Physical Exam  Alert, pleasant and in no acute distress.  Neck: Supple, no JVD or carotid bruit  Heart: Regular rate and rhythm, no murmur  Lungs: Clear to auscultation  Lower extremities: No edema  Abd: nontender, no mass  Assessment/Plan   Problem List Items Addressed This Visit    None  Visit Diagnoses         Codes    Annual physical exam    -  Primary Z00.00    Controlled type 2 diabetes mellitus without complication, without long-term current use of insulin (Multi)     E11.9    Relevant Orders    POCT Glycosylated Hemoglobin (HGB A1C) docked device    Albumin-Creatinine Ratio, Urine Random (Completed)        Patient here for annual. Care gaps addressed.  DM2: under great control..  see 4 mos.       "

## 2024-12-10 LAB
CREAT UR-MCNC: 188.6 MG/DL (ref 20–370)
MICROALBUMIN UR-MCNC: <7 MG/L
MICROALBUMIN/CREAT UR: NORMAL MG/G{CREAT}

## 2025-06-16 ENCOUNTER — APPOINTMENT (OUTPATIENT)
Dept: PRIMARY CARE | Facility: CLINIC | Age: 70
End: 2025-06-16
Payer: MEDICARE

## 2025-06-16 VITALS
HEIGHT: 65 IN | OXYGEN SATURATION: 97 % | WEIGHT: 196.8 LBS | HEART RATE: 90 BPM | BODY MASS INDEX: 32.79 KG/M2 | DIASTOLIC BLOOD PRESSURE: 62 MMHG | TEMPERATURE: 97.3 F | SYSTOLIC BLOOD PRESSURE: 120 MMHG

## 2025-06-16 DIAGNOSIS — E11.9 TYPE 2 DIABETES MELLITUS WITHOUT COMPLICATION, WITHOUT LONG-TERM CURRENT USE OF INSULIN: Primary | ICD-10-CM

## 2025-06-16 DIAGNOSIS — L98.9 SKIN LESION: ICD-10-CM

## 2025-06-16 DIAGNOSIS — E11.9 DM TYPE 2, NOT AT GOAL: ICD-10-CM

## 2025-06-16 DIAGNOSIS — R16.0 LIVER MASS: ICD-10-CM

## 2025-06-16 LAB — HBA1C MFR BLD: 6.8 % (ref 4.2–6.5)

## 2025-06-16 PROCEDURE — 3044F HG A1C LEVEL LT 7.0%: CPT | Performed by: FAMILY MEDICINE

## 2025-06-16 PROCEDURE — 1159F MED LIST DOCD IN RCRD: CPT | Performed by: FAMILY MEDICINE

## 2025-06-16 PROCEDURE — 99214 OFFICE O/P EST MOD 30 MIN: CPT | Performed by: FAMILY MEDICINE

## 2025-06-16 PROCEDURE — 3078F DIAST BP <80 MM HG: CPT | Performed by: FAMILY MEDICINE

## 2025-06-16 PROCEDURE — 1036F TOBACCO NON-USER: CPT | Performed by: FAMILY MEDICINE

## 2025-06-16 PROCEDURE — 83036 HEMOGLOBIN GLYCOSYLATED A1C: CPT | Mod: CLIA WAIVED TEST | Performed by: FAMILY MEDICINE

## 2025-06-16 PROCEDURE — G2211 COMPLEX E/M VISIT ADD ON: HCPCS | Performed by: FAMILY MEDICINE

## 2025-06-16 PROCEDURE — 3008F BODY MASS INDEX DOCD: CPT | Performed by: FAMILY MEDICINE

## 2025-06-16 PROCEDURE — 3074F SYST BP LT 130 MM HG: CPT | Performed by: FAMILY MEDICINE

## 2025-06-16 PROCEDURE — 1160F RVW MEDS BY RX/DR IN RCRD: CPT | Performed by: FAMILY MEDICINE

## 2025-06-16 PROCEDURE — 1126F AMNT PAIN NOTED NONE PRSNT: CPT | Performed by: FAMILY MEDICINE

## 2025-06-16 RX ORDER — METFORMIN HYDROCHLORIDE 850 MG/1
850 TABLET ORAL EVERY 12 HOURS
Qty: 180 TABLET | Refills: 3 | Status: SHIPPED | OUTPATIENT
Start: 2025-06-16 | End: 2026-06-16

## 2025-06-16 ASSESSMENT — PAIN SCALES - GENERAL: PAINLEVEL_OUTOF10: 0-NO PAIN

## 2025-06-16 ASSESSMENT — PATIENT HEALTH QUESTIONNAIRE - PHQ9
2. FEELING DOWN, DEPRESSED OR HOPELESS: NOT AT ALL
SUM OF ALL RESPONSES TO PHQ9 QUESTIONS 1 AND 2: 0
1. LITTLE INTEREST OR PLEASURE IN DOING THINGS: NOT AT ALL

## 2025-06-16 NOTE — PROGRESS NOTES
Subjective   Patient ID: Candice Ram is a 70 y.o. male who presents for Diabetes (Patient is here for A1C check. ).  HPI  70-year-old male for checkup on diabetes.  Also a lesion on his forehead he would like looked at.  He states the lesion has been there for many years but seems to be changing colors.  Review of Systems  Constitutional: no chills, no fever and no night sweats.   Eyes: no blurred vision and no eyesight problems.   ENT: no hearing loss, no nasal congestion, no nasal discharge, no hoarseness and no sore throat.   Cardiovascular: no chest pain, no intermittent leg claudication, no lower extremity edema, no palpitations and no syncope.   Respiratory: no cough, no shortness of breath during exertion, no shortness of breath at rest and no wheezing.   Gastrointestinal: no abdominal pain, no blood in stools, no constipation, no diarrhea, no melena, no nausea, no rectal pain and no vomiting.   Genitourinary: no dysuria, no change in urinary frequency, no urinary hesitancy and no feelings of urinary urgency.   Neurological: no difficulty walking, no headache, no limb weakness, no numbness and no tingling.   Psychiatric: no anxiety, no depression, no anhedonia and no substance use disorders.   Endocrine: no recent weight gain and no recent weight loss.   Hematologic/Lymphatic: no tendency for easy bruising and no swollen glands.  Visit Vitals  /62 (BP Location: Left arm, Patient Position: Sitting, BP Cuff Size: Adult)   Pulse 90   Temp 36.3 °C (97.3 °F) (Temporal)        Objective   Physical Exam  Constitutional: Alert and in no acute distress. Well developed, well nourished.   Eyes: Pupils were equal in size, round, reactive to light (PERRL) with normal accommodation and extraocular movements intact (EOMI). Ophthalmoscopic examination: Normal.   Ears, Nose, Mouth, and Throat: External inspection of ears and nose: Normal. Otoscopic examination: Normal. Lips, teeth, and gums: Normal. Oropharynx:  Normal.   Neck: No neck mass was observed. Supple. Thyroid not enlarged and there were no palpable thyroid nodules.   Cardiovascular: Heart rate and rhythm were normal, normal S1 and S2, no gallops, no murmurs and no pericardial rub. Carotid pulses: Normal with no bruits. Abdominal aorta: Normal. Pedal pulses: Normal. No peripheral edema.   Pulmonary: No respiratory distress. Clear bilateral breath sounds.   Abdomen: Soft nontender; no abdominal mass palpated. Normal bowel sounds. No organomegaly.   Skin: Normal skin color and pigmentation, normal skin turgor, and no rash.   Psychiatric: Judgment and insight: Intact. Mood and affect: Normal.  Assessment/Plan   Problem List Items Addressed This Visit           ICD-10-CM    Diabetes mellitus (Multi) - Primary E11.9    Relevant Orders    POCT Glycosylated Hemoglobin (HGB A1C) docked device    Liver mass R16.0    DM type 2, not at goal E11.9    Relevant Medications    metFORMIN (Glucophage) 850 mg tablet    Skin lesion L98.9

## 2025-09-22 ENCOUNTER — APPOINTMENT (OUTPATIENT)
Dept: PRIMARY CARE | Facility: CLINIC | Age: 70
End: 2025-09-22
Payer: MEDICARE